# Patient Record
Sex: FEMALE | Race: WHITE | URBAN - METROPOLITAN AREA
[De-identification: names, ages, dates, MRNs, and addresses within clinical notes are randomized per-mention and may not be internally consistent; named-entity substitution may affect disease eponyms.]

---

## 2017-10-09 NOTE — PATIENT DISCUSSION
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  Very Rare to see toxicity from Amitriptyline use, advised this could be from a genetic issue.  Not behaving like classisc Bulls Eye pattern like chloriquine use.  Rec stay off of Amitriptyline.  Stressed need to watch out for any other High Risk meds like plaquenil.

## 2017-10-09 NOTE — PATIENT DISCUSSION
rec pt have genetic testing panel, [facility] will look into lab offering no cost testing.  Will hold off on rec any supplements or diet changes until get testing done.

## 2018-01-02 NOTE — PATIENT DISCUSSION
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  Very Rare to see toxicity from Amitriptyline use, advised this could be from a genetic issue.  Not behaving like classisc Bulls Eye pattern like chloriquine use.  Rec stay off of Amitriptyline.  Stressed need to watch out for any other High Risk meds like plaquenil.  [facility] to send exam/testing to [place] to see if pt eligible for Genetic testing/acedemic study if possible for pt to qualify.

## 2018-03-22 ENCOUNTER — IMPORTED ENCOUNTER (OUTPATIENT)
Dept: URBAN - METROPOLITAN AREA CLINIC 9 | Facility: CLINIC | Age: 68
End: 2018-03-22

## 2018-07-09 NOTE — PATIENT DISCUSSION
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  Very Rare to see toxicity from Amitriptyline use, advised this could be from a genetic issue.  Not behaving like classisc Bulls Eye pattern like chloriquine use.  Rec stay off of Amitriptyline.  Stressed need to watch out for any other High Risk meds like plaquenil.  [facility] to send exam/testing to [place] to see if pt eligible for Genetic testing/acedemic study if possible for pt to qualify. 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 Rec genetic testing, [facility] will order kit and nikolai pt for blood draw here.

## 2018-08-13 NOTE — PATIENT DISCUSSION
Pt here for blood draw for genetic testing lab by Dr. Isaac Esparza).   Unfortunately pt's vein blew during draw, pt will return when gets back from vacation for another attempt.

## 2019-03-28 ENCOUNTER — IMPORTED ENCOUNTER (OUTPATIENT)
Dept: URBAN - METROPOLITAN AREA CLINIC 9 | Facility: CLINIC | Age: 69
End: 2019-03-28

## 2019-10-30 NOTE — PATIENT DISCUSSION
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  Very Rare to see toxicity from Amitriptyline use, advised this could be from a genetic issue.  Not behaving like classisc Bulls Eye pattern like chloriquine use.  Rec stay off of Amitriptyline.  Stressed need to watch out for any other High Risk meds like plaquenil.  [facility] to send exam/testing to [place] to see if pt eligible for Genetic testing/acedemic study if possible for pt to qualify. 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 Rec genetic testing, [facility] will order kit and nikolai pt for blood draw here.

## 2020-06-24 ENCOUNTER — IMPORTED ENCOUNTER (OUTPATIENT)
Dept: URBAN - METROPOLITAN AREA CLINIC 9 | Facility: CLINIC | Age: 70
End: 2020-06-24

## 2020-12-17 NOTE — PATIENT DISCUSSION
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  Very Rare to see toxicity from Amitriptyline use, advised this could be from a genetic issue.  Not behaving like classisc Bulls Eye pattern like chloriquine use.  Rec stay off of Amitriptyline.  Stressed need to watch out for any other High Risk meds like plaquenil.  [facility] to send exam/testing to [place] to see if pt eligible for Genetic testing/acedemic study if possible for pt to qualify. 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 Rec genetic testing, [facility] will order kit and nikolai pt for blood draw here.

## 2021-06-28 ENCOUNTER — IMPORTED ENCOUNTER (OUTPATIENT)
Dept: URBAN - METROPOLITAN AREA CLINIC 9 | Facility: CLINIC | Age: 71
End: 2021-06-28

## 2021-10-18 ASSESSMENT — VISUAL ACUITY
OD_CC: 20/30 - SN
OS_SC: 20/40 -2 SN
OS_CC: 20/50 +2 SN
OD_CC: 20/20 SN
OS_SC: 20/80 SN
OD_SC: 20/50 SN
OS_CC: 20/20 SN
OD_CC: 20/20 SN
OD_CC: 20/25 + SN
OS_CC: 20/40 SN
OD_SC: 20/70 + SN
OD_SC: 20/50 SN
OD_CC: 20/30 - SN
OS_CC: 20/40 SN
OS_CC: 20/30 -2 SN
OS_CC: 20/25 - SN
OD_SC: 20/80 - SN
OD_CC: 20/40 SN
OS_SC: 20/60 SN
OS_CC: 20/25 SN
OS_SC: 20/60 - SN
OD_CC: 20/20 -2 SN

## 2021-10-18 ASSESSMENT — KERATOMETRY
OD_AXISANGLE_DEGREES: 120
OS_AXISANGLE_DEGREES: 86
OS_K1POWER_DIOPTERS: 44.5
OD_AXISANGLE2_DEGREES: 30
OS_AXISANGLE2_DEGREES: 170
OD_AXISANGLE2_DEGREES: 52
OS_K2POWER_DIOPTERS: 45
OS_K2POWER_DIOPTERS: 44.75
OS_K2POWER_DIOPTERS: 45
OD_K2POWER_DIOPTERS: 45.25
OS_AXISANGLE_DEGREES: 86
OS_AXISANGLE_DEGREES: 80
OS_K1POWER_DIOPTERS: 44
OD_K2POWER_DIOPTERS: 45.25
OD_K1POWER_DIOPTERS: 45
OS_AXISANGLE2_DEGREES: 170
OS_AXISANGLE2_DEGREES: 176
OD_AXISANGLE_DEGREES: 150
OD_K2POWER_DIOPTERS: 45.25
OD_K1POWER_DIOPTERS: 45
OS_AXISANGLE2_DEGREES: 176
OS_K1POWER_DIOPTERS: 44.5
OS_K2POWER_DIOPTERS: 45
OD_AXISANGLE_DEGREES: 159
OD_AXISANGLE2_DEGREES: 60
OS_AXISANGLE_DEGREES: 80
OD_K1POWER_DIOPTERS: 44.75
OS_K1POWER_DIOPTERS: 44
OD_K1POWER_DIOPTERS: 44.75
OD_AXISANGLE2_DEGREES: 69
OD_K2POWER_DIOPTERS: 45.25
OD_AXISANGLE_DEGREES: 142

## 2021-10-18 ASSESSMENT — TONOMETRY
OD_IOP_MMHG: 13
OD_IOP_MMHG: 14
OS_IOP_MMHG: 16
OD_IOP_MMHG: 15
OS_IOP_MMHG: 13
OS_IOP_MMHG: 12
OD_IOP_MMHG: 15
OS_IOP_MMHG: 12

## 2022-07-04 RX ORDER — ESTRADIOL 1 MG/1
TABLET ORAL
COMMUNITY

## 2022-07-04 RX ORDER — MEDROXYPROGESTERONE ACETATE 5 MG/1
TABLET ORAL
COMMUNITY

## 2022-07-04 RX ORDER — LEVOTHYROXINE SODIUM 112 UG/1
TABLET ORAL
COMMUNITY

## 2022-07-04 RX ORDER — ASPIRIN 81 MG/1
TABLET ORAL
COMMUNITY

## 2022-07-04 RX ORDER — LORAZEPAM 1 MG/1
TABLET ORAL
COMMUNITY

## 2022-07-04 RX ORDER — ZOLPIDEM TARTRATE 10 MG/1
TABLET ORAL
COMMUNITY

## 2022-07-04 RX ORDER — PROGESTERONE 200 MG/1
CAPSULE ORAL
COMMUNITY

## 2022-10-06 ENCOUNTER — ESTABLISHED PATIENT (OUTPATIENT)
Dept: URBAN - METROPOLITAN AREA CLINIC 9 | Facility: CLINIC | Age: 72
End: 2022-10-06

## 2022-10-06 DIAGNOSIS — H25.13: ICD-10-CM

## 2022-10-06 DIAGNOSIS — D31.32: ICD-10-CM

## 2022-10-06 DIAGNOSIS — H43.813: ICD-10-CM

## 2022-10-06 DIAGNOSIS — H04.123: ICD-10-CM

## 2022-10-06 DIAGNOSIS — H11.153: ICD-10-CM

## 2022-10-06 PROCEDURE — 92014 COMPRE OPH EXAM EST PT 1/>: CPT

## 2022-10-06 PROCEDURE — 92015 DETERMINE REFRACTIVE STATE: CPT

## 2022-10-06 ASSESSMENT — VISUAL ACUITY
OD_CC: 20/20
OS_CC: 20/20
OD_SC: 20/50
OU_CC: 20/20
OS_SC: 20/40
OU_SC: 20/30

## 2022-10-06 ASSESSMENT — TONOMETRY
OD_IOP_MMHG: 12
OS_IOP_MMHG: 14

## 2023-10-23 ENCOUNTER — ESTABLISHED PATIENT (OUTPATIENT)
Facility: LOCATION | Age: 73
End: 2023-10-23

## 2023-10-23 DIAGNOSIS — H04.123: ICD-10-CM

## 2023-10-23 DIAGNOSIS — D31.32: ICD-10-CM

## 2023-10-23 DIAGNOSIS — H52.223: ICD-10-CM

## 2023-10-23 DIAGNOSIS — H25.13: ICD-10-CM

## 2023-10-23 PROCEDURE — 92014 COMPRE OPH EXAM EST PT 1/>: CPT

## 2023-10-23 PROCEDURE — 92015 DETERMINE REFRACTIVE STATE: CPT

## 2023-10-23 ASSESSMENT — VISUAL ACUITY
OD_CC: 20/30-1
OS_SC: 20/40-1
OS_CC: 20/60-1
OU_SC: 20/30-1
OD_SC: 20/30-2
OU_CC: 20/30-1

## 2023-10-23 ASSESSMENT — KERATOMETRY
OD_K2POWER_DIOPTERS: 45.25
OS_K2POWER_DIOPTERS: 45.00
OD_AXISANGLE_DEGREES: 111
OD_K1POWER_DIOPTERS: 44.50
OS_AXISANGLE_DEGREES: 88
OD_AXISANGLE2_DEGREES: 21
OS_AXISANGLE2_DEGREES: 178
OS_K1POWER_DIOPTERS: 44.00

## 2023-10-23 ASSESSMENT — TONOMETRY
OS_IOP_MMHG: 15
OD_IOP_MMHG: 13

## 2024-11-07 ENCOUNTER — COMPREHENSIVE EXAM (OUTPATIENT)
Facility: LOCATION | Age: 74
End: 2024-11-07

## 2024-11-07 DIAGNOSIS — H25.13: ICD-10-CM

## 2024-11-07 DIAGNOSIS — H04.123: ICD-10-CM

## 2024-11-07 DIAGNOSIS — H52.223: ICD-10-CM

## 2024-11-07 PROCEDURE — 92015 DETERMINE REFRACTIVE STATE: CPT

## 2024-11-07 PROCEDURE — 92014 COMPRE OPH EXAM EST PT 1/>: CPT
